# Patient Record
Sex: MALE | Race: WHITE | NOT HISPANIC OR LATINO | Employment: PART TIME | URBAN - METROPOLITAN AREA
[De-identification: names, ages, dates, MRNs, and addresses within clinical notes are randomized per-mention and may not be internally consistent; named-entity substitution may affect disease eponyms.]

---

## 2019-08-08 ENCOUNTER — OFFICE VISIT (OUTPATIENT)
Dept: GASTROENTEROLOGY | Facility: CLINIC | Age: 34
End: 2019-08-08
Payer: COMMERCIAL

## 2019-08-08 VITALS
SYSTOLIC BLOOD PRESSURE: 120 MMHG | HEART RATE: 80 BPM | BODY MASS INDEX: 22.4 KG/M2 | WEIGHT: 160 LBS | DIASTOLIC BLOOD PRESSURE: 84 MMHG | HEIGHT: 71 IN

## 2019-08-08 DIAGNOSIS — K21.9 GASTROESOPHAGEAL REFLUX DISEASE, ESOPHAGITIS PRESENCE NOT SPECIFIED: Primary | ICD-10-CM

## 2019-08-08 PROCEDURE — 99204 OFFICE O/P NEW MOD 45 MIN: CPT | Performed by: INTERNAL MEDICINE

## 2019-08-08 NOTE — PROGRESS NOTES
[id number] [facility] - Outpatient Consultation  Storm Sanabria 37 y o  male MRN: [id number]  Encounter: [id number]    ASSESSMENT AND PLAN:      1  Gastroesophageal reflux disease, esophagitis presence not specified  Present for about 2 years  Transient relief with Zantac  No alarm symptoms  Discussed workup options and he is agreeable to upper endoscopy to assess for esophagitis, Grace's esophagus and predisposing factors such as hiatal hernia and H pylori, particularly with family history of gastric cancer in his brother      Followup Appointment[de-identified]  Pending EGD  ______________________________________________________________________    Chief Complaint   Patient presents with    GERD       HPI:   Storm Sanabria is a 37y o  year old male who presents for evaluation of reflux, he is accompanied by his wife  Symptoms began about 2 years ago with throat pain  His PCP recommended Zantac which she took for several months with good relief  He weaned off and then symptoms recurred  Symptoms have become more prevalent and over the past year he has had reflux complaints manifesting as substernal burning nearly every day  It often wakes him from sleep  He has symptoms during the day  Symptoms are worse with sauce  He denies dysphagia, melena, weight loss or other alarm symptoms  He has a family history of gastric cancer and his brother and uncle  Historical Information   Past Medical History:   Diagnosis Date    GERD (gastroesophageal reflux disease)      No past surgical history on file    Social History     Substance and Sexual Activity   Alcohol Use Yes    Frequency: Monthly or less     Social History     Substance and Sexual Activity   Drug Use Not on file     Social History     Tobacco Use   Smoking Status Never Smoker   Smokeless Tobacco Never Used     Family History   Problem Relation Age of Onset    No Known Problems Mother     No Known Problems Father  Cancer Brother     Stomach cancer Brother        Meds/Allergies   No current outpatient medications on file  No Known Allergies    PHYSICAL EXAM:    Blood pressure 120/84, pulse 80, height 5' 11" (1 803 m), weight 72 6 kg (160 lb)  Body mass index is 22 32 kg/m²  General Appearance: NAD, cooperative, alert  HEENT:  Normocephalic, atraumatic, anicteric  Neck:  Supple, symmetrical, trachea midline  Lungs:  Clear to auscultation bilaterally; no rales, rhonchi or wheezing; respirations unlabored   Heart[de-identified]  Regular rate and rhythm; no murmur, rub, or gallop  Abdomen:  Soft, non-tender, non-distended; normal bowel sounds; no masses, no organomegaly   Rectal:  Deferred   Extremities: No cyanosis, clubbing or edema   Skin:  No jaundice, rashes, or lesions   Lymph nodes: No palpable cervical lymphadenopathy  Psych: Normal affect, good eye contact  Neuro: No gross deficits, AAOx3    Lab Results:   No results found for: WBC, HGB, HCT, MCV, PLT  No results found for: NA, K, CL, CO2, ANIONGAP, BUN, CREATININE, GLUCOSE, GLUF, CALCIUM, CORRECTEDCA, AST, ALT, ALKPHOS, PROT, BILITOT, EGFR  No results found for: IRON, TIBC, FERRITIN  No results found for: LIPASE    Radiology Results:   No results found  REVIEW OF SYSTEMS:    CONSTITUTIONAL: Denies any fever, chills, rigors, and weight loss  HEENT: No earache or tinnitus  Denies hearing loss or visual disturbances  CARDIOVASCULAR: No chest pain or palpitations  RESPIRATORY: Denies any cough, hemoptysis, shortness of breath or dyspnea on exertion  GASTROINTESTINAL: As noted in the History of Present Illness  GENITOURINARY: No problems with urination  Denies any hematuria or dysuria  NEUROLOGIC: No dizziness or vertigo, denies headaches  MUSCULOSKELETAL: Denies any muscle or joint pain  SKIN: Denies skin rashes or itching  ENDOCRINE: Denies excessive thirst  Denies intolerance to heat or cold  PSYCHOSOCIAL: Denies depression or anxiety   Denies any recent memory loss